# Patient Record
Sex: FEMALE | Race: WHITE | NOT HISPANIC OR LATINO | Employment: PART TIME | URBAN - METROPOLITAN AREA
[De-identification: names, ages, dates, MRNs, and addresses within clinical notes are randomized per-mention and may not be internally consistent; named-entity substitution may affect disease eponyms.]

---

## 2017-06-20 ENCOUNTER — TRANSCRIBE ORDERS (OUTPATIENT)
Dept: ADMINISTRATIVE | Facility: HOSPITAL | Age: 22
End: 2017-06-20

## 2017-06-20 DIAGNOSIS — S80.12XA CONTUSION OF LEFT LOWER LEG, INITIAL ENCOUNTER: Primary | ICD-10-CM

## 2017-06-22 ENCOUNTER — HOSPITAL ENCOUNTER (OUTPATIENT)
Dept: NON INVASIVE DIAGNOSTICS | Facility: HOSPITAL | Age: 22
Discharge: HOME | End: 2017-06-22
Attending: FAMILY MEDICINE
Payer: COMMERCIAL

## 2017-06-22 DIAGNOSIS — S80.12XA CONTUSION OF LEFT LOWER LEG, INITIAL ENCOUNTER: ICD-10-CM

## 2017-06-22 PROCEDURE — 93971 EXTREMITY STUDY: CPT

## 2018-01-16 NOTE — RESULT NOTES
Verified Results  (1) TISSUE EXAM [id number] 03:10PM Montserrat Patel     Test Name Result Flag Reference   LAB AP CASE REPORT (Report)     Surgical Pathology Report             Case: [id number]                   Authorizing Provider: Lelia Shore MD    Collected:      07/21/2016 1510        Ordering Location:   [address] [place] [address]   Received:      07/22/2016 [address] Operating Room                            Pathologist:      Prem Del Castillo MD                              Specimen:  Adrenal, Right   LAB AP FINAL DIAGNOSIS (Report)     A  Right adrenalectomy (15 grams):  - Large hematoma, focally resorbed, replacing adrenal medulla and   associated with focal hemorrhagic necrosis of adrenal cortex  - No evidence of neoplasia or malignancy  Interpretation performed at [facility]  Electronically signed by Prem Del Castillo MD on 7/26/2016 at 1:00 PM   LAB AP SURGICAL ADDITIONAL INFORMATION (Report)     These tests were developed and their performance characteristics   determined by [facility] or [employer]  They may not be cleared or approved by the [place]  Food and   Drug Administration  The FDA has determined that such clearance or   approval is not necessary  These tests are used for clinical purposes  They should not be regarded as investigational or for research  This   laboratory has been approved by [place] 88, designated as a high-complexity   laboratory and is qualified to perform these tests  LAB AP GROSS DESCRIPTION (Report)     A  The specimen is received in formalin, labeled with the patient's name   and hospital number, and is designated right adrenal  The specimen   consists of a tan yellow purple soft tissue fragment measuring 4 6 by 2 6   x 2 1 cm which weighs 15 g  The surface/margin of resection is inked blue     The specimen is sectioned revealing a well-circumscribed red-brown nodule within yellow gold adrenal parenchyma  The nodule measures approximately   3 6 x 2 1 x 1 6 cm  No other gross abnormalities are noted  No lymph nodes   are identified  Representative sections  Four cassettes  Note: The estimated total formalin fixation time based upon information   provided by the submitting clinician and the standard processing schedule   is 29 25 hours  MAC

## 2018-01-17 NOTE — MISCELLANEOUS
Message  Mom notified that mass that was excised was benign  Seeing Dr Rosibel Hodge tomorrow for f/u  Active Problems   Right adrenal mass (255 9) (E27 9)          Current Meds   1  No Reported Medications Recorded    Allergies    1   No Known Drug Allergies    Signatures   Electronically signed by : Marvin Stone MD; Aug 21 2016  9:51AM EST                       (Author)

## 2019-04-24 ENCOUNTER — OFFICE VISIT (OUTPATIENT)
Dept: OBGYN CLINIC | Facility: CLINIC | Age: 24
End: 2019-04-24
Payer: COMMERCIAL

## 2019-04-24 VITALS
HEIGHT: 64 IN | WEIGHT: 228 LBS | SYSTOLIC BLOOD PRESSURE: 108 MMHG | BODY MASS INDEX: 38.93 KG/M2 | DIASTOLIC BLOOD PRESSURE: 62 MMHG

## 2019-04-24 DIAGNOSIS — Z12.4 SCREENING FOR CERVICAL CANCER: Primary | ICD-10-CM

## 2019-04-24 DIAGNOSIS — Z11.51 SCREENING FOR HPV (HUMAN PAPILLOMAVIRUS): ICD-10-CM

## 2019-04-24 DIAGNOSIS — Z11.3 SCREENING FOR STD (SEXUALLY TRANSMITTED DISEASE): ICD-10-CM

## 2019-04-24 PROCEDURE — 99385 PREV VISIT NEW AGE 18-39: CPT | Performed by: OBSTETRICS & GYNECOLOGY

## 2019-04-28 LAB
C TRACH RRNA CVX QL NAA+PROBE: NEGATIVE
CYTOLOGIST CVX/VAG CYTO: NORMAL
DX ICD CODE: NORMAL
Lab: NORMAL
N GONORRHOEA RRNA CVX QL NAA+PROBE: NEGATIVE
OTHER STN SPEC: NORMAL
OTHER STN SPEC: NORMAL
PATH REPORT.FINAL DX SPEC: NORMAL
SL AMB NOTE:: NORMAL
SL AMB SPECIMEN ADEQUACY: NORMAL
SL AMB TEST METHODOLOGY: NORMAL

## 2019-04-30 ENCOUNTER — TELEPHONE (OUTPATIENT)
Dept: OBGYN CLINIC | Facility: CLINIC | Age: 24
End: 2019-04-30

## 2020-10-08 ENCOUNTER — HOSPITAL ENCOUNTER (EMERGENCY)
Facility: HOSPITAL | Age: 25
Discharge: HOME | End: 2020-10-08
Attending: EMERGENCY MEDICINE
Payer: COMMERCIAL

## 2020-10-08 ENCOUNTER — TELEPHONE (OUTPATIENT)
Dept: OBGYN CLINIC | Facility: CLINIC | Age: 25
End: 2020-10-08

## 2020-10-08 VITALS
HEART RATE: 75 BPM | DIASTOLIC BLOOD PRESSURE: 70 MMHG | BODY MASS INDEX: 37.76 KG/M2 | OXYGEN SATURATION: 98 % | RESPIRATION RATE: 20 BRPM | TEMPERATURE: 98.3 F | WEIGHT: 220 LBS | SYSTOLIC BLOOD PRESSURE: 132 MMHG

## 2020-10-08 DIAGNOSIS — A60.00 HERPES GENITALIA: Primary | ICD-10-CM

## 2020-10-08 LAB
BACTERIA UR QL AUTO: ABNORMAL /HPF
BILIRUB UR QL STRIP: NEGATIVE
CLARITY UR: CLEAR
COLOR UR: YELLOW
EXT PREG TEST URINE: NEGATIVE
EXT. CONTROL ED NAV: NORMAL
GLUCOSE UR STRIP-MCNC: NEGATIVE MG/DL
HGB UR QL STRIP.AUTO: NEGATIVE
KETONES UR STRIP-MCNC: NEGATIVE MG/DL
LEUKOCYTE ESTERASE UR QL STRIP: ABNORMAL
MUCOUS THREADS UR QL AUTO: ABNORMAL
NITRITE UR QL STRIP: NEGATIVE
NON-SQ EPI CELLS URNS QL MICRO: ABNORMAL /HPF
PH UR STRIP.AUTO: 6.5 [PH]
PROT UR STRIP-MCNC: NEGATIVE MG/DL
RBC #/AREA URNS AUTO: ABNORMAL /HPF
SP GR UR STRIP.AUTO: <=1.005 (ref 1–1.03)
UROBILINOGEN UR QL STRIP.AUTO: 0.2 E.U./DL
WBC #/AREA URNS AUTO: ABNORMAL /HPF

## 2020-10-08 PROCEDURE — 96372 THER/PROPH/DIAG INJ SC/IM: CPT

## 2020-10-08 PROCEDURE — 87591 N.GONORRHOEAE DNA AMP PROB: CPT | Performed by: EMERGENCY MEDICINE

## 2020-10-08 PROCEDURE — 87491 CHLMYD TRACH DNA AMP PROBE: CPT | Performed by: EMERGENCY MEDICINE

## 2020-10-08 PROCEDURE — 81025 URINE PREGNANCY TEST: CPT | Performed by: EMERGENCY MEDICINE

## 2020-10-08 PROCEDURE — 81001 URINALYSIS AUTO W/SCOPE: CPT | Performed by: EMERGENCY MEDICINE

## 2020-10-08 PROCEDURE — 99284 EMERGENCY DEPT VISIT MOD MDM: CPT | Performed by: EMERGENCY MEDICINE

## 2020-10-08 PROCEDURE — 99283 EMERGENCY DEPT VISIT LOW MDM: CPT

## 2020-10-08 RX ORDER — AZITHROMYCIN 500 MG/1
1000 TABLET, FILM COATED ORAL ONCE
Status: COMPLETED | OUTPATIENT
Start: 2020-10-08 | End: 2020-10-08

## 2020-10-08 RX ORDER — VALACYCLOVIR HYDROCHLORIDE 1 G/1
1000 TABLET, FILM COATED ORAL 2 TIMES DAILY
Qty: 14 TABLET | Refills: 0 | Status: SHIPPED | OUTPATIENT
Start: 2020-10-08 | End: 2020-10-15

## 2020-10-08 RX ORDER — VALACYCLOVIR HYDROCHLORIDE 500 MG/1
1000 TABLET, FILM COATED ORAL ONCE
Status: COMPLETED | OUTPATIENT
Start: 2020-10-08 | End: 2020-10-08

## 2020-10-08 RX ADMIN — VALACYCLOVIR HYDROCHLORIDE 1000 MG: 500 TABLET, FILM COATED ORAL at 13:44

## 2020-10-08 RX ADMIN — AZITHROMYCIN 1000 MG: 500 TABLET, FILM COATED ORAL at 13:44

## 2020-10-08 RX ADMIN — CEFTRIAXONE SODIUM 250 MG: 250 INJECTION, POWDER, FOR SOLUTION INTRAMUSCULAR; INTRAVENOUS at 13:43

## 2020-10-10 LAB
C TRACH DNA SPEC QL NAA+PROBE: NEGATIVE
N GONORRHOEA DNA SPEC QL NAA+PROBE: NEGATIVE

## 2021-02-10 ENCOUNTER — ANNUAL EXAM (OUTPATIENT)
Dept: OBGYN CLINIC | Facility: MEDICAL CENTER | Age: 26
End: 2021-02-10
Payer: COMMERCIAL

## 2021-02-10 VITALS
DIASTOLIC BLOOD PRESSURE: 78 MMHG | SYSTOLIC BLOOD PRESSURE: 122 MMHG | BODY MASS INDEX: 38.93 KG/M2 | WEIGHT: 228 LBS | HEIGHT: 64 IN

## 2021-02-10 DIAGNOSIS — B97.7 LOW RISK HPV INFECTION: Primary | ICD-10-CM

## 2021-02-10 DIAGNOSIS — K64.8 OTHER HEMORRHOIDS: ICD-10-CM

## 2021-02-10 PROCEDURE — 99395 PREV VISIT EST AGE 18-39: CPT | Performed by: OBSTETRICS & GYNECOLOGY

## 2021-02-10 RX ORDER — IMIQUIMOD 12.5 MG/.25G
1 CREAM TOPICAL 3 TIMES WEEKLY
Qty: 24 PACKET | Refills: 3 | Status: SHIPPED | OUTPATIENT
Start: 2021-02-10 | End: 2021-05-11

## 2021-02-10 RX ORDER — HYDROCORTISONE ACETATE PRAMOXINE HCL 2.5; 1 G/100G; G/100G
CREAM TOPICAL 3 TIMES DAILY
Qty: 30 G | Refills: 3 | Status: SHIPPED | OUTPATIENT
Start: 2021-02-10

## 2021-02-10 RX ORDER — OMEPRAZOLE 40 MG/1
CAPSULE, DELAYED RELEASE ORAL
COMMUNITY

## 2021-02-10 NOTE — PROGRESS NOTES
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    Aldara cream used     3  Contraception      Options for birth control with the risk and benefits discussed in detail     1  Combination medications    Pill / patch / ring     Regular they cycle, stop pregnancy but not protect against std    The risk of nausea and vomiting     Risk of blood clot discussed     2  Depo-provera   Good compliance since q 12 weeks   But could cause irregular bleeding weight gain and hair loss   Irreversible bone loss and cant use greater then 3 years  3  IUD    discussed hormonal and non hormonal    Risk of irregular bleeding    Infection increase if not in a monogamous relationship and painful insertion     4  Nexplanon   Discussed that it is progesterone    Advised that the cycles will be irregular for a few months   Advised that it is for 3 years   Does not cause bone loss like the depo provera or decline in estrogen    No protection for STD     At the beginning she was thinking about using depo   We discussed the weight gain   She will think about other LARCS   She will consider Nexplanon and IUD           25 y o ,Patient's last menstrual period was 01/18/2021 (approximate)    C/O hemorrhoids and uncomfortable feeling in the rectum area           Past medical / social / surgical / family history reviewed and updated   Medication and allergies discussed in detail and updated     Review of Systems - History obtained from chart review and the patient  General ROS: negative  Psychological ROS: negative  Ophthalmic ROS: negative  ENT ROS: negative  Allergy and Immunology ROS: negative  Hematological and Lymphatic ROS: negative  Endocrine ROS: negative  Breast ROS: negative for breast lumps  Respiratory ROS: no cough, shortness of breath, or wheezing  Cardiovascular ROS: no chest pain or dyspnea on exertion  Gastrointestinal ROS: no abdominal pain, change in bowel habits, or black or bloody stools itching and fear for hemorrhoids   Genito-Urinary ROS: no dysuria, trouble voiding, or hematuria  Musculoskeletal ROS: negative  Neurological ROS: no TIA or stroke symptoms  Dermatological ROS: negative        Physical Exam  Vitals signs reviewed  Constitutional:       Appearance: She is well-developed  Neck:      Musculoskeletal: Normal range of motion  Thyroid: No thyromegaly  Cardiovascular:      Rate and Rhythm: Normal rate  Heart sounds: Normal heart sounds  Pulmonary:      Effort: Pulmonary effort is normal  No accessory muscle usage or respiratory distress  Chest:      Chest wall: No tenderness  Breasts: Breasts are symmetrical          Right: No inverted nipple, mass or tenderness  Left: No inverted nipple, mass or tenderness  Abdominal:      General: There is no distension  Palpations: Abdomen is soft  Tenderness: There is no abdominal tenderness  There is no guarding or rebound  Genitourinary:     Exam position: Supine  Labia:         Right: No rash, tenderness, lesion or injury  Left: No rash, tenderness, lesion or injury  Vagina: Normal  No foreign body  No vaginal discharge or bleeding  Cervix: No cervical motion tenderness or discharge  Uterus: Not enlarged and not fixed  Adnexa:         Right: No mass, tenderness or fullness  Left: No mass, tenderness or fullness  Rectum: No external hemorrhoid  Lymphadenopathy:      Cervical: No cervical adenopathy  Upper Body:      Right upper body: No supraclavicular adenopathy  Left upper body: No supraclavicular adenopathy  Neurological:      Mental Status: She is alert and oriented to person, place, and time     Psychiatric:         Speech: Speech normal          Behavior: Behavior normal          Thought Content:  Thought content normal          Judgment: Judgment normal

## 2022-05-23 ENCOUNTER — HOSPITAL ENCOUNTER (OUTPATIENT)
Dept: RADIOLOGY | Facility: HOSPITAL | Age: 27
Discharge: HOME | End: 2022-05-23
Attending: FAMILY MEDICINE
Payer: COMMERCIAL

## 2022-05-23 DIAGNOSIS — M79.641 RIGHT HAND PAIN: ICD-10-CM

## 2022-05-23 PROCEDURE — 73090 X-RAY EXAM OF FOREARM: CPT

## 2022-05-23 PROCEDURE — 73130 X-RAY EXAM OF HAND: CPT

## 2023-05-10 ENCOUNTER — TELEPHONE (OUTPATIENT)
Dept: OBGYN CLINIC | Facility: HOSPITAL | Age: 28
End: 2023-05-10

## 2023-05-10 NOTE — TELEPHONE ENCOUNTER
Will you be willing to see this patient? Patient recently had surgery with another hospital, the father would like to transfer her care due to distance  She recently had hardware placed in humerus on 5/8/23 Patient is scheduled with you on 5/16 and is having the records sent over ?

## 2023-05-10 NOTE — TELEPHONE ENCOUNTER
Hello,  Please advise if the following patient can be forced onto the schedule:    Patient: Cori Carvajal Primer     : [birth year]    MRN: [id number]    Call back #: Antonia Lambert [phone]    Insurance: Mahesh Murphy    Reason for appointment: Daughter recently had surgery with another hospital, they would like to transfer her care due to distance  She recently had hardware placed in humerus on 23 father is able to obtain records if requested    Requested doctor/location: [place]       Thank you

## 2023-05-16 ENCOUNTER — HOSPITAL ENCOUNTER (OUTPATIENT)
Dept: RADIOLOGY | Facility: HOSPITAL | Age: 28
Discharge: HOME | End: 2023-05-16
Attending: ORTHOPAEDIC SURGERY

## 2023-05-16 ENCOUNTER — OFFICE VISIT (OUTPATIENT)
Dept: OBGYN CLINIC | Facility: HOSPITAL | Age: 28
End: 2023-05-16

## 2023-05-16 VITALS
SYSTOLIC BLOOD PRESSURE: 105 MMHG | DIASTOLIC BLOOD PRESSURE: 72 MMHG | HEIGHT: 64 IN | BODY MASS INDEX: 37.9 KG/M2 | HEART RATE: 81 BPM | WEIGHT: 222 LBS

## 2023-05-16 DIAGNOSIS — Z98.890 S/P ORIF (OPEN REDUCTION INTERNAL FIXATION) FRACTURE: ICD-10-CM

## 2023-05-16 DIAGNOSIS — S42.301A CLOSED FRACTURE OF SHAFT OF RIGHT HUMERUS, UNSPECIFIED FRACTURE MORPHOLOGY, INITIAL ENCOUNTER: ICD-10-CM

## 2023-05-16 DIAGNOSIS — Z87.81 S/P ORIF (OPEN REDUCTION INTERNAL FIXATION) FRACTURE: ICD-10-CM

## 2023-05-16 DIAGNOSIS — M89.8X2 PAIN OF RIGHT HUMERUS: Primary | ICD-10-CM

## 2023-05-16 DIAGNOSIS — M89.8X2 PAIN OF RIGHT HUMERUS: ICD-10-CM

## 2023-05-16 RX ORDER — ONDANSETRON 4 MG/1
TABLET, FILM COATED ORAL
COMMUNITY
Start: 2023-05-09

## 2023-05-16 RX ORDER — OXYCODONE HYDROCHLORIDE 5 MG/1
TABLET ORAL
COMMUNITY
Start: 2023-05-09

## 2023-05-16 RX ORDER — SULFAMETHOXAZOLE AND TRIMETHOPRIM 800; 160 MG/1; MG/1
1 TABLET ORAL 2 TIMES DAILY
COMMUNITY
Start: 2023-05-01

## 2023-05-16 RX ORDER — LORAZEPAM 0.5 MG/1
TABLET ORAL
COMMUNITY
Start: 2023-05-15
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
## 2023-05-18 ENCOUNTER — APPOINTMENT (OUTPATIENT)
Dept: RADIOLOGY | Facility: CLINIC | Age: 28
End: 2023-05-18

## 2023-05-18 DIAGNOSIS — S21.90XD: ICD-10-CM

## 2023-05-18 DIAGNOSIS — S27.2XXD: ICD-10-CM

## 2023-05-23 ENCOUNTER — OFFICE VISIT (OUTPATIENT)
Dept: OBGYN CLINIC | Facility: HOSPITAL | Age: 28
End: 2023-05-23

## 2023-05-23 ENCOUNTER — HOSPITAL ENCOUNTER (OUTPATIENT)
Dept: RADIOLOGY | Facility: HOSPITAL | Age: 28
Discharge: HOME | End: 2023-05-23
Attending: ORTHOPAEDIC SURGERY

## 2023-05-23 VITALS
WEIGHT: 225 LBS | SYSTOLIC BLOOD PRESSURE: 114 MMHG | DIASTOLIC BLOOD PRESSURE: 71 MMHG | BODY MASS INDEX: 38.41 KG/M2 | HEART RATE: 69 BPM | HEIGHT: 64 IN

## 2023-05-23 DIAGNOSIS — S12.001D CLOSED NONDISPLACED FRACTURE OF FIRST CERVICAL VERTEBRA WITH ROUTINE HEALING, UNSPECIFIED FRACTURE MORPHOLOGY, SUBSEQUENT ENCOUNTER: ICD-10-CM

## 2023-05-23 DIAGNOSIS — M89.8X2 PAIN OF RIGHT HUMERUS: ICD-10-CM

## 2023-05-23 DIAGNOSIS — Z87.81 S/P ORIF (OPEN REDUCTION INTERNAL FIXATION) FRACTURE: ICD-10-CM

## 2023-05-23 DIAGNOSIS — S42.301A CLOSED FRACTURE OF SHAFT OF RIGHT HUMERUS, UNSPECIFIED FRACTURE MORPHOLOGY, INITIAL ENCOUNTER: ICD-10-CM

## 2023-05-23 DIAGNOSIS — R52 PAIN: Primary | ICD-10-CM

## 2023-05-23 DIAGNOSIS — Z98.890 S/P ORIF (OPEN REDUCTION INTERNAL FIXATION) FRACTURE: ICD-10-CM
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
## 2023-05-24 ENCOUNTER — EVALUATION (OUTPATIENT)
Dept: PHYSICAL THERAPY | Facility: CLINIC | Age: 28
End: 2023-05-24

## 2023-05-24 DIAGNOSIS — Z98.890 S/P ORIF (OPEN REDUCTION INTERNAL FIXATION) FRACTURE: ICD-10-CM

## 2023-05-24 DIAGNOSIS — S42.301A CLOSED FRACTURE OF SHAFT OF RIGHT HUMERUS, UNSPECIFIED FRACTURE MORPHOLOGY, INITIAL ENCOUNTER: ICD-10-CM

## 2023-05-24 DIAGNOSIS — M89.8X2 PAIN OF RIGHT HUMERUS: Primary | ICD-10-CM

## 2023-05-24 DIAGNOSIS — Z87.81 S/P ORIF (OPEN REDUCTION INTERNAL FIXATION) FRACTURE: ICD-10-CM
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
## 2023-05-31 ENCOUNTER — OFFICE VISIT (OUTPATIENT)
Dept: PHYSICAL THERAPY | Facility: CLINIC | Age: 28
End: 2023-05-31

## 2023-05-31 DIAGNOSIS — M89.8X2 PAIN OF RIGHT HUMERUS: Primary | ICD-10-CM

## 2023-05-31 NOTE — PROGRESS NOTES
"Daily Note     Today's date: 2023  Patient name: Chikis Stone  : 1995  MRN: [id number]  Referring provider: Scott Delacruz PA-C  Dx:   Encounter Diagnosis     ICD-10-CM    1  Pain of right humerus  M89 8X2                      Subjective: pt reports that she is has min soreness after her LV and is having no difficulty with her hep  Objective: See treatment diary below      Assessment:  Initiated tx as listed  Pt has made sig improvements in elbow flexion and extension but continues to be most limited in extension  No sig pain reprots t/o tx  Monitor response and progress as muna NV     Plan: Continue per plan of care        Precautions: C1 fracture, hard brace, lifting restriction of 5#      Manuals            Elbow/shoulder rom kl kl                                                  Neuro Re-Ed                                                                                                        Ther Ex             Extension hang 1# x3' 1# 3'           Pulley flexion/scaption  flexion 5\"x20           rows  nv           Wrist curls  3#x20           Elbow self-flexion stretch  hep           Wrist flex/ext stretch  30\"x3ea           Triceps press  nv           scap retraction with cane ext  10\"x10           Ther Activity             ube  4'/4'                        Gait Training                                       Modalities                                            "

## 2023-06-07 ENCOUNTER — OFFICE VISIT (OUTPATIENT)
Dept: PHYSICAL THERAPY | Facility: CLINIC | Age: 28
End: 2023-06-07
Payer: COMMERCIAL

## 2023-06-07 DIAGNOSIS — S42.301A CLOSED FRACTURE OF SHAFT OF RIGHT HUMERUS, UNSPECIFIED FRACTURE MORPHOLOGY, INITIAL ENCOUNTER: ICD-10-CM

## 2023-06-07 DIAGNOSIS — M89.8X2 PAIN OF RIGHT HUMERUS: Primary | ICD-10-CM

## 2023-06-07 DIAGNOSIS — Z87.81 S/P ORIF (OPEN REDUCTION INTERNAL FIXATION) FRACTURE: ICD-10-CM

## 2023-06-07 DIAGNOSIS — Z98.890 S/P ORIF (OPEN REDUCTION INTERNAL FIXATION) FRACTURE: ICD-10-CM

## 2023-06-07 PROCEDURE — 97140 MANUAL THERAPY 1/> REGIONS: CPT

## 2023-06-07 PROCEDURE — 97110 THERAPEUTIC EXERCISES: CPT

## 2023-06-07 PROCEDURE — 97530 THERAPEUTIC ACTIVITIES: CPT

## 2023-06-07 NOTE — PROGRESS NOTES
"Daily Note     Today's date: 2023  Patient name: Madisyn Manzo  : 1995  MRN: [id number]  Referring provider: Marylen Skillern, PA-C  Dx:   Encounter Diagnosis     ICD-10-CM    1  Pain of right humerus  M89 8X2       2  Closed fracture of shaft of right humerus, unspecified fracture morphology, initial encounter  S42 301A       3  S/P ORIF (open reduction internal fixation) fracture  Z98 890     Z87 81           Start Time: 848  Stop Time: 928  Total time in clinic (min): 40 minutes       Subjective: Patient states, \"It's a little stiff  \"      Objective: See treatment diary below  Assessment: Progression of therapeutic exercise program is tolerated well  Right shoulder ROM is WNLs  Right elbow ROM is approaching normal       Plan: Continue treatment as per PT plan of care         Precautions: C1 fracture, hard brace, lifting restriction of 5#      Manuals  67          Elbow/shoulder rom kl kl JLW                                                 Neuro Re-Ed                                                                                                        Ther Ex             Extension hang 1# x3' 1# 3'           Pulley flexion/scaption  flexion 5\"x20 flex, scap  5\"x20 ea          rows  nv green  20          Wrist curls  3#x20 flex, ext  3#  20 ea          Elbow self-flexion stretch  hep           Wrist flex/ext stretch  30\"x3ea 30\"x3 ea          Triceps press  nv green  20          scap retraction with cane ext  10\"x10 10\"x10                       Ther Activity             ube  4'/4' 4'/4'                       Gait Training                                       Modalities                                            "

## 2023-06-13 ENCOUNTER — HOSPITAL ENCOUNTER (OUTPATIENT)
Dept: RADIOLOGY | Facility: HOSPITAL | Age: 28
Discharge: HOME | End: 2023-06-13
Attending: ORTHOPAEDIC SURGERY
Payer: COMMERCIAL

## 2023-06-13 ENCOUNTER — OFFICE VISIT (OUTPATIENT)
Dept: OBGYN CLINIC | Facility: HOSPITAL | Age: 28
End: 2023-06-13
Payer: COMMERCIAL

## 2023-06-13 VITALS
BODY MASS INDEX: 38.41 KG/M2 | HEIGHT: 64 IN | SYSTOLIC BLOOD PRESSURE: 104 MMHG | WEIGHT: 225 LBS | HEART RATE: 69 BPM | DIASTOLIC BLOOD PRESSURE: 71 MMHG

## 2023-06-13 DIAGNOSIS — Z98.890 S/P ORIF (OPEN REDUCTION INTERNAL FIXATION) FRACTURE: Primary | ICD-10-CM

## 2023-06-13 DIAGNOSIS — Z87.81 S/P ORIF (OPEN REDUCTION INTERNAL FIXATION) FRACTURE: ICD-10-CM

## 2023-06-13 DIAGNOSIS — Z87.81 S/P ORIF (OPEN REDUCTION INTERNAL FIXATION) FRACTURE: Primary | ICD-10-CM

## 2023-06-13 DIAGNOSIS — Z98.890 S/P ORIF (OPEN REDUCTION INTERNAL FIXATION) FRACTURE: ICD-10-CM

## 2023-06-13 PROCEDURE — 99213 OFFICE O/P EST LOW 20 MIN: CPT | Performed by: ORTHOPAEDIC SURGERY

## 2023-06-13 PROCEDURE — 73060 X-RAY EXAM OF HUMERUS: CPT

## 2023-06-13 RX ORDER — SENNA PLUS 8.6 MG/1
8.6 TABLET ORAL
COMMUNITY
Start: 2023-05-09

## 2023-06-13 RX ORDER — ACETAMINOPHEN 325 MG/1
650 TABLET ORAL
COMMUNITY
Start: 2023-05-09

## 2023-06-13 RX ORDER — DOCUSATE SODIUM 100 MG/1
100 CAPSULE, LIQUID FILLED ORAL
COMMUNITY
Start: 2023-05-09

## 2023-06-13 RX ORDER — OMEPRAZOLE 10 MG/1
10 CAPSULE, DELAYED RELEASE ORAL
COMMUNITY
Start: 2023-05-06
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
## 2023-06-13 NOTE — ASSESSMENT & PLAN NOTE
We are going to increase her weight lifting to 30 pounds  We will see her back in 6 weeks with repeat films  We will leave her overall weight restrictions per Dr Emily Wang

## 2023-06-14 ENCOUNTER — OFFICE VISIT (OUTPATIENT)
Dept: PHYSICAL THERAPY | Facility: CLINIC | Age: 28
End: 2023-06-14
Payer: COMMERCIAL

## 2023-06-14 DIAGNOSIS — Z98.890 S/P ORIF (OPEN REDUCTION INTERNAL FIXATION) FRACTURE: ICD-10-CM

## 2023-06-14 DIAGNOSIS — Z87.81 S/P ORIF (OPEN REDUCTION INTERNAL FIXATION) FRACTURE: ICD-10-CM

## 2023-06-14 DIAGNOSIS — S42.301A CLOSED FRACTURE OF SHAFT OF RIGHT HUMERUS, UNSPECIFIED FRACTURE MORPHOLOGY, INITIAL ENCOUNTER: ICD-10-CM

## 2023-06-14 DIAGNOSIS — M89.8X2 PAIN OF RIGHT HUMERUS: Primary | ICD-10-CM

## 2023-06-14 PROCEDURE — 97530 THERAPEUTIC ACTIVITIES: CPT

## 2023-06-14 PROCEDURE — 97110 THERAPEUTIC EXERCISES: CPT

## 2023-06-14 NOTE — PROGRESS NOTES
"Daily Note     Today's date: 2023  Patient name: Monisha Dick  : 1995  MRN: [id number]  Referring provider: Joey Ortega PA-C  Dx:   Encounter Diagnosis     ICD-10-CM    1  Pain of right humerus  M89 8X2       2  Closed fracture of shaft of right humerus, unspecified fracture morphology, initial encounter  S42 301A       3  S/P ORIF (open reduction internal fixation) fracture  Z98 890     Z87 81           Start Time: 835  Stop Time: 921  Total time in clinic (min): 46 minutes       Subjective: Patient reports she was given a 30 pound lifting restriction in regards to her right elbow  Patient reports her left knee has been bothering her  Objective: See treatment diary below  Assessment: Progression of therapeutic exercise program is tolerated well without complaints  Right elbow and shoulder ROM is WNLs  Plan: Continue treatment as per PT plan of care         Precautions: C1 fracture, hard brace, lifting restriction of 5#      Manuals  6 6         Elbow/shoulder rom kl kl JLW                                                 Neuro Re-Ed                                                                                                        Ther Ex             Extension hang 1# x3' 1# 3'           Pulleys flexion/scaption  flexion 5\"x20 flex, scap  5\"x20 ea          rows  nv green  20 javier  18 2  3x10         lpd    javier  14 8  2x10         Wrist curls  3#x20 flex, ext  3#  20 ea flex, ext  4#  30 ea         Pronation/  supination    5#  20 ea         Elbow self-flexion stretch  hep           Wrist flex/ext stretch  30\"x3ea 30\"x3 ea          Triceps press  nv green  20 javier  20 4  3x10         scap retraction with cane ext  10\"x10 10\"x10          push ups     counter   2x10         Bicep curl     5#   3x10         shoulder flex, scap     3#   20 ea                      Ther Activity             ube  4'/4' 4'/4' 4'/4'                      Gait Training      " Modalities

## 2023-06-20 ENCOUNTER — HOSPITAL ENCOUNTER (OUTPATIENT)
Dept: RADIOLOGY | Facility: HOSPITAL | Age: 28
Discharge: HOME | End: 2023-06-20
Attending: ORTHOPAEDIC SURGERY
Payer: COMMERCIAL

## 2023-06-20 ENCOUNTER — OFFICE VISIT (OUTPATIENT)
Dept: OBGYN CLINIC | Facility: HOSPITAL | Age: 28
End: 2023-06-20
Payer: COMMERCIAL

## 2023-06-20 ENCOUNTER — TELEPHONE (OUTPATIENT)
Dept: OBGYN CLINIC | Facility: HOSPITAL | Age: 28
End: 2023-06-20

## 2023-06-20 VITALS
SYSTOLIC BLOOD PRESSURE: 110 MMHG | BODY MASS INDEX: 38.39 KG/M2 | HEIGHT: 64 IN | HEART RATE: 79 BPM | DIASTOLIC BLOOD PRESSURE: 70 MMHG | WEIGHT: 224.87 LBS

## 2023-06-20 DIAGNOSIS — S12.001D CLOSED NONDISPLACED FRACTURE OF FIRST CERVICAL VERTEBRA WITH ROUTINE HEALING, UNSPECIFIED FRACTURE MORPHOLOGY, SUBSEQUENT ENCOUNTER: Primary | ICD-10-CM

## 2023-06-20 DIAGNOSIS — S12.001D CLOSED NONDISPLACED FRACTURE OF FIRST CERVICAL VERTEBRA WITH ROUTINE HEALING, UNSPECIFIED FRACTURE MORPHOLOGY, SUBSEQUENT ENCOUNTER: ICD-10-CM

## 2023-06-20 PROCEDURE — 72040 X-RAY EXAM NECK SPINE 2-3 VW: CPT

## 2023-06-20 PROCEDURE — 99213 OFFICE O/P EST LOW 20 MIN: CPT | Performed by: ORTHOPAEDIC SURGERY

## 2023-06-20 NOTE — TELEPHONE ENCOUNTER
Caller: Patient     Doctor: [address] [facility]    Reason for call: Patient is calling stating she called 3 places and [facility] stated they do not have the cervical soft collar  She stated we did not have any in the office today   She would like advice on what to do    Call back#: [phone]

## 2023-06-20 NOTE — TELEPHONE ENCOUNTER
LM letting pt know we got some collars in this afternoon and she can stop back in and get a collar  Let her know to call back and let us know what time
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
## 2023-06-20 NOTE — TELEPHONE ENCOUNTER
Caller: Dottie Marquez    Doctor: Charo Torres    Reason for call: Patient wants to know if some can  the collar or if she needs to be fitted for it  If she has to pick it up can it be done tomorrow?     Call back#: [phone]

## 2023-06-21 ENCOUNTER — OFFICE VISIT (OUTPATIENT)
Dept: PHYSICAL THERAPY | Facility: CLINIC | Age: 28
End: 2023-06-21
Payer: COMMERCIAL

## 2023-06-21 DIAGNOSIS — Z98.890 S/P ORIF (OPEN REDUCTION INTERNAL FIXATION) FRACTURE: ICD-10-CM

## 2023-06-21 DIAGNOSIS — Z87.81 S/P ORIF (OPEN REDUCTION INTERNAL FIXATION) FRACTURE: ICD-10-CM

## 2023-06-21 DIAGNOSIS — M89.8X2 PAIN OF RIGHT HUMERUS: Primary | ICD-10-CM

## 2023-06-21 DIAGNOSIS — S42.301A CLOSED FRACTURE OF SHAFT OF RIGHT HUMERUS, UNSPECIFIED FRACTURE MORPHOLOGY, INITIAL ENCOUNTER: ICD-10-CM

## 2023-06-21 PROCEDURE — 97110 THERAPEUTIC EXERCISES: CPT

## 2023-06-21 PROCEDURE — 97530 THERAPEUTIC ACTIVITIES: CPT

## 2023-06-21 NOTE — PROGRESS NOTES
"Daily Note     Today's date: 2023  Patient name: Jhoana Hughes  : 1995  MRN: [id number]  Referring provider: Remberto Diaz PA-C  Dx:   Encounter Diagnosis     ICD-10-CM    1  Pain of right humerus  M89 8X2       2  Closed fracture of shaft of right humerus, unspecified fracture morphology, initial encounter  S42 301A       3  S/P ORIF (open reduction internal fixation) fracture  Z98 890     Z87 81           Start Time: 848  Stop Time: 928  Total time in clinic (min): 40 minutes       Subjective: Patient reports she experienced significant muscle soreness in her chest following the last PT session  Patient denies any significant stiffness in her right elbow  Objective: See treatment diary below  Assessment: Progression of therapeutic exercise program is tolerated well  Upper extremity strength is improving  Plan: Continue treatment as per PT plan of care         Precautions: C1 fracture, soft collar      Manuals         Elbow/shoulder rom kl kl JLW                                                 Neuro Re-Ed                                                                                                        Ther Ex             Extension hang 1# x3' 1# 3'           Pulleys flexion/scaption  flexion 5\"x20 flex, scap  5\"x20 ea          rows  nv green  20 javier  18 2  3x10 javier  19 5  3x10        lpd    javier  14 8  2x10 javier  15 1  3x10        Wrist curls  3#x20 flex, ext  3#  20 ea flex, ext  4#  30 ea flex, ext  5#  20 ea        Pronation/  supination    5#  20 ea 5#  20 ea        Elbow self-flexion stretch  hep           Wrist flex/ext stretch  30\"x3ea 30\"x3 ea          Triceps press  nv green  20 javier  20 4  3x10 javier  21 8  3x10        scap retraction with cane ext  10\"x10 10\"x10          push ups     counter   2x10  counter   3x10        Bicep curl     5#   3x10  8#   2x10        shoulder flex, scap     3#   20 ea  3#   20 ea        wall " "pball taps      30\"x2                                  Ther Activity             ube  4'/4' 4'/4' 4'/4' 4'/4'                     Gait Training                                       Modalities                                            "

## 2023-06-28 ENCOUNTER — OFFICE VISIT (OUTPATIENT)
Dept: PHYSICAL THERAPY | Facility: CLINIC | Age: 28
End: 2023-06-28
Payer: COMMERCIAL

## 2023-06-28 DIAGNOSIS — Z87.81 S/P ORIF (OPEN REDUCTION INTERNAL FIXATION) FRACTURE: ICD-10-CM

## 2023-06-28 DIAGNOSIS — M89.8X2 PAIN OF RIGHT HUMERUS: Primary | ICD-10-CM

## 2023-06-28 DIAGNOSIS — S42.301A CLOSED FRACTURE OF SHAFT OF RIGHT HUMERUS, UNSPECIFIED FRACTURE MORPHOLOGY, INITIAL ENCOUNTER: ICD-10-CM

## 2023-06-28 DIAGNOSIS — Z98.890 S/P ORIF (OPEN REDUCTION INTERNAL FIXATION) FRACTURE: ICD-10-CM

## 2023-06-28 PROCEDURE — 97530 THERAPEUTIC ACTIVITIES: CPT

## 2023-06-28 PROCEDURE — 97110 THERAPEUTIC EXERCISES: CPT

## 2023-06-28 NOTE — PROGRESS NOTES
"Daily Note     Today's date: 2023  Patient name: Meryl Nicole  : 1995  MRN: [id number]  Referring provider: Mariia Mann PA-C  Dx:   Encounter Diagnosis     ICD-10-CM    1  Pain of right humerus  M89 8X2       2  Closed fracture of shaft of right humerus, unspecified fracture morphology, initial encounter  S42 301A       3  S/P ORIF (open reduction internal fixation) fracture  Z98 890     Z87 81           Start Time: 845  Stop Time: 925  Total time in clinic (min): 40 minutes       Subjective: Patient reports her right arm is \"fine  \"  Patient reports soreness in her lower cervical/upper thoracic spine  Objective: See treatment diary below  Assessment: Patient is doing well with therapeutic exercise program   Fatigue noted while performing bicep curls with increased weight  Plan: Continue treatment as per PT plan of care         Precautions: C1 fracture, soft collar      Manuals  6       Elbow/shoulder rom kl kl JLW                                                 Neuro Re-Ed                                                                                                        Ther Ex             Extension hang 1# x3' 1# 3'           Pulleys flexion/scaption  flexion 5\"x20 flex, scap  5\"x20 ea          rows  nv green  20 javier  18 2  3x10 javier  19 5  3x10 javier  20 1  3x10       lpd    javier  14 8  2x10 javier  15 1  3x10 javier  16 2  3x10       Wrist curls  3#x20 flex, ext  3#  20 ea flex, ext  4#  30 ea flex, ext  5#  20 ea flex, ext  5#  20 ea       Pronation/  supination    5#  20 ea 5#  20 ea 5#  20 ea       Elbow self-flexion stretch  hep           Wrist flex/ext stretch  30\"x3ea 30\"x3 ea          Triceps press  nv green  20 javier  20 4  3x10 javier  21 8  3x10 javier  22 7  3x10       scap retraction with cane ext  10\"x10 10\"x10          push ups     counter   2x10  counter   3x10  counter   3x10       Bicep curl     5#   3x10  8#   " "2x10  10#   2x10   8#   1x10        shoulder flex, scap     3#   20 ea  3#   20 ea  3#   20 ea        wall pball taps      30\"x2  30\"x2                                 Ther Activity             ube  4'/4' 4'/4' 4'/4' 4'/4' 4'/4'                    Gait Training                                       Modalities                                            "

## 2023-07-05 ENCOUNTER — OFFICE VISIT (OUTPATIENT)
Dept: PHYSICAL THERAPY | Facility: CLINIC | Age: 28
End: 2023-07-05
Payer: COMMERCIAL

## 2023-07-05 DIAGNOSIS — M89.8X2 PAIN OF RIGHT HUMERUS: Primary | ICD-10-CM

## 2023-07-05 PROCEDURE — 97110 THERAPEUTIC EXERCISES: CPT | Performed by: PHYSICAL THERAPIST

## 2023-07-05 PROCEDURE — 97530 THERAPEUTIC ACTIVITIES: CPT | Performed by: PHYSICAL THERAPIST

## 2023-07-05 NOTE — PROGRESS NOTES
Daily Note     Today's date: 2023  Patient name: Shawna Lagos  : 1995  MRN: [id number]  Referring provider: Jonatan Ortiz PA-C  Dx:   Encounter Diagnosis     ICD-10-CM    1. Pain of right humerus  M89.8X2                         Subjective: pt offers no new complaints in regards to her shoulder. Continued reports of stiffness t/o cervical and upper thoracic spine. Reports compliance with her current hep. Objective: See treatment diary below. R elbow:    Tender to Palpation: none    ROM:   Flexion:120   Extension: 0   Supination: 40   Pronation:30      Strength:   Flexion: 4+/5   Extension:  4+/5   Supination:  5-/5   Pronation:  4+/5    R shoulder:    AROM:   Flexion: 160   Abduction:150   Shoulder shrug noted with active flexion and abduction    PROM:   Flexion: 160   Abduction: 160   ER:  wf   IR: wfl    Strength:    Flexion:  4+/5   Abduction:  4+ /5   ER:    5/5   IR:     5-/5                        Assessment: Pt has achieved a majority of her goals set at the start of therapy, is independent with her hep, and will be Dc'd at this time. She is instructed to call the clinic if she has question with her hep or if symptoms return.        Plan: DC to hep       Precautions: C1 fracture, soft collar      Manuals       Elbow/shoulder rom kl kl JLW                                                 Neuro Re-Ed                                                                                                        Ther Ex             Extension hang 1# x3' 1# 3'           Pulleys flexion/scaption  flexion 5"x20 flex, scap  5"x20 ea          rows  nv green  20 javier  18.2  3x10 javier  19.5  3x10 javier  20.1  3x10 javier 20.5 x20      lpd    javier  14.8  2x10 javier  15.1  3x10 javier  16.2  3x10 18.0 x20      Wrist curls  3#x20 flex, ext  3#  20 ea flex, ext  4#  30 ea flex, ext  5#  20 ea flex, ext  5#  20 ea 5"x20 ea      Pronation/  supination 5#  20 ea 5#  20 ea 5#  20 ea 5#x20ea      Elbow self-flexion stretch  hep           Wrist flex/ext stretch  30"x3ea 30"x3 ea          Triceps press  nv green  20 javier  20.4  3x10 javier  21.8  3x10 javier  22.7  3x10       scap retraction with cane ext  10"x10 10"x10          push ups     counter   2x10  counter   3x10  counter   3x10 Pl[place] x20      Bicep curl     5#   3x10  8#   2x10  10#   2x10   8#   1x10  10#x20      shoulder flex, scap     3#   20 ea  3#   20 ea  3#   20 ea        wall pball taps      30"x2  30"x2                                 Ther Activity             ube  4'/4' 4'/4' 4'/4' 4'/4' 4'/4' 4'/4'                   Gait Training                                       Modalities

## 2023-07-18 ENCOUNTER — HOSPITAL ENCOUNTER (OUTPATIENT)
Dept: RADIOLOGY | Facility: HOSPITAL | Age: 28
Discharge: HOME | End: 2023-07-18
Attending: ORTHOPAEDIC SURGERY
Payer: COMMERCIAL

## 2023-07-18 ENCOUNTER — OFFICE VISIT (OUTPATIENT)
Dept: OBGYN CLINIC | Facility: HOSPITAL | Age: 28
End: 2023-07-18
Payer: COMMERCIAL

## 2023-07-18 VITALS
HEIGHT: 64 IN | WEIGHT: 224.87 LBS | DIASTOLIC BLOOD PRESSURE: 72 MMHG | SYSTOLIC BLOOD PRESSURE: 111 MMHG | HEART RATE: 73 BPM | BODY MASS INDEX: 38.39 KG/M2

## 2023-07-18 DIAGNOSIS — S12.001D CLOSED NONDISPLACED FRACTURE OF FIRST CERVICAL VERTEBRA WITH ROUTINE HEALING, UNSPECIFIED FRACTURE MORPHOLOGY, SUBSEQUENT ENCOUNTER: Primary | ICD-10-CM

## 2023-07-18 DIAGNOSIS — S12.001D CLOSED NONDISPLACED FRACTURE OF FIRST CERVICAL VERTEBRA WITH ROUTINE HEALING, UNSPECIFIED FRACTURE MORPHOLOGY, SUBSEQUENT ENCOUNTER: ICD-10-CM

## 2023-07-18 PROCEDURE — 72040 X-RAY EXAM NECK SPINE 2-3 VW: CPT

## 2023-07-18 PROCEDURE — 99213 OFFICE O/P EST LOW 20 MIN: CPT | Performed by: ORTHOPAEDIC SURGERY
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
## 2023-07-25 ENCOUNTER — OFFICE VISIT (OUTPATIENT)
Dept: OBGYN CLINIC | Facility: HOSPITAL | Age: 28
End: 2023-07-25
Payer: COMMERCIAL

## 2023-07-25 ENCOUNTER — HOSPITAL ENCOUNTER (OUTPATIENT)
Dept: RADIOLOGY | Facility: HOSPITAL | Age: 28
Discharge: HOME | End: 2023-07-25
Attending: ORTHOPAEDIC SURGERY
Payer: COMMERCIAL

## 2023-07-25 VITALS
WEIGHT: 224 LBS | HEIGHT: 64 IN | BODY MASS INDEX: 38.24 KG/M2 | DIASTOLIC BLOOD PRESSURE: 67 MMHG | HEART RATE: 72 BPM | SYSTOLIC BLOOD PRESSURE: 98 MMHG

## 2023-07-25 DIAGNOSIS — Z98.890 S/P ORIF (OPEN REDUCTION INTERNAL FIXATION) FRACTURE: Primary | ICD-10-CM

## 2023-07-25 DIAGNOSIS — Z87.81 S/P ORIF (OPEN REDUCTION INTERNAL FIXATION) FRACTURE: Primary | ICD-10-CM

## 2023-07-25 DIAGNOSIS — S42.324D CLOSED NONDISPLACED TRANSVERSE FRACTURE OF SHAFT OF RIGHT HUMERUS WITH ROUTINE HEALING, SUBSEQUENT ENCOUNTER: ICD-10-CM

## 2023-07-25 DIAGNOSIS — Z87.81 S/P ORIF (OPEN REDUCTION INTERNAL FIXATION) FRACTURE: ICD-10-CM

## 2023-07-25 DIAGNOSIS — Z98.890 S/P ORIF (OPEN REDUCTION INTERNAL FIXATION) FRACTURE: ICD-10-CM

## 2023-07-25 PROCEDURE — 99213 OFFICE O/P EST LOW 20 MIN: CPT | Performed by: ORTHOPAEDIC SURGERY

## 2023-07-25 PROCEDURE — 73060 X-RAY EXAM OF HUMERUS: CPT
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
## 2023-07-28 ENCOUNTER — EVALUATION (OUTPATIENT)
Dept: PHYSICAL THERAPY | Facility: CLINIC | Age: 28
End: 2023-07-28
Payer: COMMERCIAL

## 2023-07-28 DIAGNOSIS — S12.001D CLOSED NONDISPLACED FRACTURE OF FIRST CERVICAL VERTEBRA WITH ROUTINE HEALING, UNSPECIFIED FRACTURE MORPHOLOGY, SUBSEQUENT ENCOUNTER: ICD-10-CM

## 2023-07-28 DIAGNOSIS — M89.8X2 PAIN OF RIGHT HUMERUS: Primary | ICD-10-CM

## 2023-07-28 DIAGNOSIS — M54.2 CERVICAL SPINE PAIN: ICD-10-CM

## 2023-07-28 PROCEDURE — 97140 MANUAL THERAPY 1/> REGIONS: CPT | Performed by: PHYSICAL THERAPIST

## 2023-07-28 PROCEDURE — 97161 PT EVAL LOW COMPLEX 20 MIN: CPT | Performed by: PHYSICAL THERAPIST
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
## 2023-08-02 ENCOUNTER — OFFICE VISIT (OUTPATIENT)
Dept: PHYSICAL THERAPY | Facility: CLINIC | Age: 28
End: 2023-08-02
Payer: COMMERCIAL

## 2023-08-02 DIAGNOSIS — M54.2 CERVICAL SPINE PAIN: ICD-10-CM

## 2023-08-02 DIAGNOSIS — S12.001D CLOSED NONDISPLACED FRACTURE OF FIRST CERVICAL VERTEBRA WITH ROUTINE HEALING, UNSPECIFIED FRACTURE MORPHOLOGY, SUBSEQUENT ENCOUNTER: Primary | ICD-10-CM

## 2023-08-02 PROCEDURE — 97140 MANUAL THERAPY 1/> REGIONS: CPT

## 2023-08-02 PROCEDURE — 97110 THERAPEUTIC EXERCISES: CPT

## 2023-08-02 NOTE — PROGRESS NOTES
Daily Note     Today's date: 2023  Patient name: Claudia Clinton  : 1995  MRN: [id number]  Referring provider: Lakisha Mendiola MD  Dx:   Encounter Diagnosis     ICD-10-CM    1. Closed nondisplaced fracture of first cervical vertebra with routine healing, unspecified fracture morphology, subsequent encounter  S12.001D       2. Cervical spine pain  M54.2           Start Time: 09  Stop Time: 1014  Total time in clinic (min): 39 minutes      Subjective: Patient reports her neck is feeling "fine."  Patient reports feeling "a little tight" when performing cervical rotation and cervical side bending. Patient reports her right elbow was tender and swollen following the last PT session. Objective: See treatment diary below. Assessment: Treatment is tolerated well. Right elbow remains tender during IASTM. Plan: Continue treatment as per PT plan of care.        Precautions: C1 fracture      Manuals  8/    Elbow/shoulder rom kl kl JLW          laser        cervical 4' cervical 4'    DTM        KL cervical cervical in supine  JLW    graston        R lateral epicondyle R lateral epicondyle  JLW    kinesiotape for scar         JLW                 Neuro Re-Ed                                                                                                        Ther Ex             Extension hang 1# x3' 1# 3'           Pulleys flexion/scaption  flexion 5"x20 flex, scap  5"x20 ea          rows  nv green  20 javier  18.2  3x10 javier  19.5  3x10 javier  20.1  3x10 javier 20.5 x20      lpd    javier  14.8  2x10 javier  15.1  3x10 javier  16.2  3x10 18.0 x20      Wrist curls  3#x20 flex, ext  3#  20 ea flex, ext  4#  30 ea flex, ext  5#  20 ea flex, ext  5#  20 ea 5"x20 ea      Pronation/  supination    5#  20 ea 5#  20 ea 5#  20 ea 5#x20ea      Elbow self-flexion stretch  hep           Wrist flex/ext stretch  30"x3ea 30"x3 ea     30"x2ea     Triceps press  nv green  20 javier  20.4  3x10 javier  21.8  3x10 javier  22.7  3x10   black tb  30    scap retraction with cane ext  10"x10 10"x10          push ups     counter   2x10  counter   3x10  counter   3x10 Plinth x20      Bicep curl     5#   3x10  8#   2x10  10#   2x10   8#   1x10  10#x20      shoulder flex, scap     3#   20 ea  3#   20 ea  3#   20 ea        wall pball taps      30"x2  30"x2       Cervical arom        Sb/rot 5"x10ea     Cervical isometrics        nv  5"x10 ea                 Ther Activity             ube  4'/4' 4'/4' 4'/4' 4'/4' 4'/4' 4'/4' nv                  Gait Training                                       Modalities

## 2023-08-09 ENCOUNTER — OFFICE VISIT (OUTPATIENT)
Dept: PHYSICAL THERAPY | Facility: CLINIC | Age: 28
End: 2023-08-09
Payer: COMMERCIAL

## 2023-08-09 DIAGNOSIS — M54.2 CERVICAL SPINE PAIN: Primary | ICD-10-CM

## 2023-08-09 DIAGNOSIS — S12.001D CLOSED NONDISPLACED FRACTURE OF FIRST CERVICAL VERTEBRA WITH ROUTINE HEALING, UNSPECIFIED FRACTURE MORPHOLOGY, SUBSEQUENT ENCOUNTER: ICD-10-CM

## 2023-08-09 PROCEDURE — 97140 MANUAL THERAPY 1/> REGIONS: CPT

## 2023-08-09 PROCEDURE — 97110 THERAPEUTIC EXERCISES: CPT

## 2023-08-09 NOTE — PROGRESS NOTES
Daily Note     Today's date: 2023  Patient name: Kimi Mccabe  : 1995  MRN: [id number]  Referring provider: Sushil Ornelas MD  Dx:   Encounter Diagnosis     ICD-10-CM    1. Cervical spine pain  M54.2       2. Closed nondisplaced fracture of first cervical vertebra with routine healing, unspecified fracture morphology, subsequent encounter  S12.001D           Start Time: 930  Stop Time: 101  Total time in clinic (min): 42 minutes      Subjective: Patient reports her neck is feeling good. Patient reports her right elbow is "better than it was last time."  Patient reports she's returned to exercising at the gym. Objective: See treatment diary below. Assessment: Treatment is tolerated well. Cervical ROM is WNLs. Pending continued progress, patient will be ready for discharge next session. Plan: Continue treatment as per PT plan of care.        Precautions: C1 fracture      Manuals  6 8 8   Elbow/shoulder rom kl kl JLW          laser        cervical 4' cervical 4' cervical 4'   DTM        KL cervical cervical in supine  JLW cervical in supine  JLW   graston        R lateral epicondyle R lateral epicondyle  JLW R lateral epicondyle  JLW   kinesiotape for scar         JLW                 Neuro Re-Ed                                                                                                        Ther Ex             Extension hang 1# x3' 1# 3'           Pulleys flexion/scaption  flexion 5"x20 flex, scap  5"x20 ea          rows  nv green  20 javier  18.2  3x10 javier  19.5  3x10 javier  20.1  3x10 javier 20.5 x20      lpd    javier  14.8  2x10 javier  15.1  3x10 javier  16.2  3x10 18.0 x20      Wrist curls  3#x20 flex, ext  3#  20 ea flex, ext  4#  30 ea flex, ext  5#  20 ea flex, ext  5#  20 ea 5"x20 ea      Pronation/  supination    5#  20 ea 5#  20 ea 5#  20 ea 5#x20ea      Elbow self-flexion stretch  hep           Wrist flex/ext stretch  30"x3ea 30"x3 ea     30"x2ea  manual   Triceps press  nv green  20 javier  20.4  3x10 javier  21.8  3x10 javier  22.7  3x10   black tb  30 javier  21.7  3x10   scap retraction with cane ext  10"x10 10"x10          push ups     counter   2x10  counter   3x10  counter   3x10 Plinth x20      Bicep curl     5#   3x10  8#   2x10  10#   2x10   8#   1x10 10#x20      shoulder flex, scap     3#   20 ea  3#   20 ea  3#   20 ea        wall pball taps      30"x2  30"x2       Cervical arom        Sb/rot 5"x10ea     Cervical isometrics        nv  5"x10 ea  5"x10 ea   ube           8' retro                Ther Activity             ube  4'/4' 4'/4' 4'/4' 4'/4' 4'/4' 4'/4' nv                  Gait Training                                       Modalities

## 2023-08-16 ENCOUNTER — APPOINTMENT (OUTPATIENT)
Dept: PHYSICAL THERAPY | Facility: CLINIC | Age: 28
End: 2023-08-16
Payer: COMMERCIAL

## 2023-08-17 ENCOUNTER — OFFICE VISIT (OUTPATIENT)
Dept: PHYSICAL THERAPY | Facility: CLINIC | Age: 28
End: 2023-08-17
Payer: COMMERCIAL

## 2023-08-17 DIAGNOSIS — M54.2 CERVICAL SPINE PAIN: Primary | ICD-10-CM

## 2023-08-17 DIAGNOSIS — M89.8X2 PAIN OF RIGHT HUMERUS: ICD-10-CM

## 2023-08-17 DIAGNOSIS — S12.001D CLOSED NONDISPLACED FRACTURE OF FIRST CERVICAL VERTEBRA WITH ROUTINE HEALING, UNSPECIFIED FRACTURE MORPHOLOGY, SUBSEQUENT ENCOUNTER: ICD-10-CM

## 2023-08-17 PROCEDURE — 97140 MANUAL THERAPY 1/> REGIONS: CPT

## 2023-08-17 PROCEDURE — 97530 THERAPEUTIC ACTIVITIES: CPT

## 2023-08-17 PROCEDURE — 97110 THERAPEUTIC EXERCISES: CPT

## 2023-08-17 NOTE — PROGRESS NOTES
Daily Note     Today's date: 2023  Patient name: May Dupree  : 1995  MRN: [id number]  Referring provider: Babar Bajwa MD  Dx:   Encounter Diagnosis     ICD-10-CM    1. Cervical spine pain  M54.2       2. Closed nondisplaced fracture of first cervical vertebra with routine healing, unspecified fracture morphology, subsequent encounter  S12.001D       3. Pain of right humerus  M89.8X2           Start Time: 0850  Stop Time: 935  Total time in clinic (min): 45 minutes    Subjective: Patient reports that she has no pain or complaints today. Objective: See treatment diary below      Assessment: Patient continues to tolerate treatment well. Good form and understanding of exercises performed. No discomfort reported with manual therapy. Patient reports today is her last scheduled visit. FOTO completed. Plan: Continue per plan of care.       Precautions: C1 fracture      Manuals    Elbow/shoulder rom kl kl JLW           laser        cervical 4' cervical 4' cervical 4' Cervical 4'   DTM        KL cervical cervical in supine  JLW cervical in supine  JLW cervical in supine JG   graston        R lateral epicondyle R lateral epicondyle  JLW R lateral epicondyle  JLW R lateral epicondyle JG   kinesiotape for scar         JLW                   Neuro Re-Ed                                                                                                                Ther Ex              Extension hang 1# x3' 1# 3'            Pulleys flexion/scaption  flexion 5"x20 flex, scap  5"x20 ea           rows  nv green  20 javier  18.2  3x10 javier  19.5  3x10 javier  20.1  3x10 javier 20.5 x20       lpd    javier  14.8  2x10 javier  15.1  3x10 javier  16.2  3x10 18.0 x20       Wrist curls  3#x20 flex, ext  3#  20 ea flex, ext  4#  30 ea flex, ext  5#  20 ea flex, ext  5#  20 ea 5"x20 ea       Pronation/  supination    5#  20 ea 5#  20 ea 5#  20 ea 5#x20ea       Elbow self-flexion stretch  hep            Wrist flex/ext stretch  30"x3ea 30"x3 ea     30"x2ea  manual 30"x2 ea   Triceps press  nv green  20 javier  20.4  3x10 javier  21.8  3x10 javier  22.7  3x10   black tb  30 javier  21.7  3x10 javier 21.7# 3x10   scap retraction with cane ext  10"x10 10"x10           push ups     counter   2x10  counter   3x10  counter   3x10 Plinth x20       Bicep curl     5#   3x10  8#   2x10  10#   2x10   8#   1x10 10#x20       shoulder flex, scap     3#   20 ea  3#   20 ea  3#   20 ea         wall pball taps      30"x2  30"x2        Cervical arom        Sb/rot 5"x10ea      Cervical isometrics        nv  5"x10 ea  5"x10 ea 5"x10ea   ube           8' retro 8' retro                 Ther Activity              ube  4'/4' 4'/4' 4'/4' 4'/4' 4'/4' 4'/4' nv                    Gait Training                                          Modalities

## 2023-09-19 ENCOUNTER — HOSPITAL ENCOUNTER (OUTPATIENT)
Dept: RADIOLOGY | Facility: HOSPITAL | Age: 28
Discharge: HOME | End: 2023-09-19
Attending: ORTHOPAEDIC SURGERY
Payer: COMMERCIAL

## 2023-09-19 ENCOUNTER — OFFICE VISIT (OUTPATIENT)
Dept: OBGYN CLINIC | Facility: HOSPITAL | Age: 28
End: 2023-09-19
Payer: COMMERCIAL

## 2023-09-19 VITALS
HEART RATE: 69 BPM | WEIGHT: 224 LBS | HEIGHT: 64 IN | BODY MASS INDEX: 38.24 KG/M2 | SYSTOLIC BLOOD PRESSURE: 113 MMHG | DIASTOLIC BLOOD PRESSURE: 76 MMHG

## 2023-09-19 DIAGNOSIS — S12.001D CLOSED NONDISPLACED FRACTURE OF FIRST CERVICAL VERTEBRA WITH ROUTINE HEALING, UNSPECIFIED FRACTURE MORPHOLOGY, SUBSEQUENT ENCOUNTER: ICD-10-CM

## 2023-09-19 DIAGNOSIS — S42.324D CLOSED NONDISPLACED TRANSVERSE FRACTURE OF SHAFT OF RIGHT HUMERUS WITH ROUTINE HEALING, SUBSEQUENT ENCOUNTER: Primary | ICD-10-CM

## 2023-09-19 PROCEDURE — 99213 OFFICE O/P EST LOW 20 MIN: CPT | Performed by: ORTHOPAEDIC SURGERY

## 2023-09-19 PROCEDURE — 72050 X-RAY EXAM NECK SPINE 4/5VWS: CPT
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
## 2023-09-26 ENCOUNTER — OFFICE VISIT (OUTPATIENT)
Dept: OBGYN CLINIC | Facility: HOSPITAL | Age: 28
End: 2023-09-26
Payer: COMMERCIAL

## 2023-09-26 ENCOUNTER — HOSPITAL ENCOUNTER (OUTPATIENT)
Dept: RADIOLOGY | Facility: HOSPITAL | Age: 28
Discharge: HOME | End: 2023-09-26
Attending: ORTHOPAEDIC SURGERY
Payer: COMMERCIAL

## 2023-09-26 VITALS — HEART RATE: 76 BPM | SYSTOLIC BLOOD PRESSURE: 110 MMHG | DIASTOLIC BLOOD PRESSURE: 72 MMHG

## 2023-09-26 DIAGNOSIS — S42.324D CLOSED NONDISPLACED TRANSVERSE FRACTURE OF SHAFT OF RIGHT HUMERUS WITH ROUTINE HEALING, SUBSEQUENT ENCOUNTER: ICD-10-CM

## 2023-09-26 DIAGNOSIS — S42.324D CLOSED NONDISPLACED TRANSVERSE FRACTURE OF SHAFT OF RIGHT HUMERUS WITH ROUTINE HEALING, SUBSEQUENT ENCOUNTER: Primary | ICD-10-CM

## 2023-09-26 PROCEDURE — 99213 OFFICE O/P EST LOW 20 MIN: CPT | Performed by: ORTHOPAEDIC SURGERY

## 2023-09-26 PROCEDURE — 73060 X-RAY EXAM OF HUMERUS: CPT

## 2023-09-26 NOTE — PROGRESS NOTES
Assessment:  1. Closed nondisplaced transverse fracture of shaft of right humerus with routine healing, subsequent encounter  XR humerus right          Plan:  4.5 months s/p right humerus ORIF at [facility] on 5/8/2023  · N restrictions with activity  · Consider evaluation with hand surgeon if right hand numbness persists  · Patient has right hand numbness with mid-shaft radial tinnel tapping   · Follow up in 3 months    To do next visit:  Return in about 3 months (around 12/26/2023) for re-check with x-rays. The above stated was discussed in layman's terms and the patient expressed understanding. All questions were answered to the patient's satisfaction. Scribe Attestation    I,:  Ismael Quintero am acting as a scribe while in the presence of the attending physician.:       I,:  Gaviota Tena MD personally performed the services described in this documentation    as scribed in my presence.:             Subjective:   Naomie Lamb is a 33 y.o. female who presents 4.5 months s/p right humerus ORIF at [facility] on 5/8/2023. She is doing well. Today she complains of occasional right hand numbness when touching mid-radial forearm. She is active without repercussion.         Review of systems negative unless otherwise specified in HPI    Past Medical History:   Diagnosis Date   • Adrenal mass ([address])    • Heartburn        Past Surgical History:   Procedure Laterality Date   • [place] LAPAROSCOPY ADRENALECTOMY PRTL/COMPL TABDL Right 7/21/2016    Procedure: ADRENALECTOMY LAPAROSCOPIC W/ ROBOTICS; POSSIBLE OPEN;  Surgeon: Elsa Ash MD;  Location: [facility];  Service: General   • TONSILLECTOMY     • WISDOM TOOTH EXTRACTION         Family History   Problem Relation Age of Onset   • Diabetes Mother    • No Known Problems Father    • No Known Problems Sister    • Lung cancer Paternal Grandmother    • Heart attack Paternal Grandmother    • Hypertension Paternal Grandmother Social History     Occupational History   • Not on file   Tobacco Use   • Smoking status: Never   • Smokeless tobacco: Never   Vaping Use   • Vaping Use: Never used   Substance and Sexual Activity   • Alcohol use:  Yes   • Drug use: No   • Sexual activity: Yes     Partners: Male     Birth control/protection: None         Current Outpatient Medications:   •  acetaminophen (Tylenol) 325 mg tablet, 650 mg (Patient not taking: Reported on 9/26/2023), Disp: , Rfl:   •  docusate sodium (Colace) 100 mg capsule, 100 mg (Patient not taking: Reported on 9/26/2023), Disp: , Rfl:   •  hydrocortisone-pramoxine (ANALPRAM-HC) 2.5-1 % rectal cream, Apply topically 3 (three) times a day (Patient not taking: Reported on 9/26/2023), Disp: 30 g, Rfl: 3  •  imiquimod (ALDARA) 5 % cream, Apply 1 packet topically 3 (three) times a week, Disp: 24 packet, Rfl: 3  •  LORazepam (ATIVAN) 0.5 mg tablet, , Disp: , Rfl:   •  omeprazole (PriLOSEC) 10 mg delayed release capsule, 10 mg (Patient not taking: Reported on 9/26/2023), Disp: , Rfl:   •  omeprazole (PriLOSEC) 40 MG capsule, 1 capsule (Patient not taking: Reported on 9/26/2023), Disp: , Rfl:   •  ondansetron (ZOFRAN) 4 mg tablet, TAKE 1 TABLET BY MOUTH EVERY 8 HOURS AS NEEDED FOR NAUSEA/VOMITING (Patient not taking: Reported on 9/26/2023), Disp: , Rfl:   •  oxyCODONE (ROXICODONE) 5 immediate release tablet, TAKE 1 TABLET BY MOUTH EVERY 6 HOURS AS NEEDED FOR SEVERE PAIN (Patient not taking: Reported on 5/16/2023), Disp: , Rfl:   •  senna (Senokot) 8.6 MG tablet, 8.6 mg (Patient not taking: Reported on 9/26/2023), Disp: , Rfl:   •  sulfamethoxazole-trimethoprim (BACTRIM DS) 800-160 mg per tablet, Take 1 tablet by mouth 2 (two) times a day (Patient not taking: Reported on 5/23/2023), Disp: , Rfl:   •  valACYclovir (VALTREX) 1,000 mg tablet, Take 1 tablet (1,000 mg total) by mouth 2 (two) times a day for 7 days, Disp: 14 tablet, Rfl: 0    No Known Allergies         Vitals:    09/26/23 0747 BP: 110/72   Pulse: 76       Objective:  Physical exam  · General: Awake, Alert, Oriented  · Eyes: Pupils equal, round and reactive to light  · Heart: regular rate and rhythm  · Lungs: No audible wheezing  · Abdomen: soft                    Ortho Exam  Right upper extremity:  No erythema or ecchymosis  Well healed anterior incisional scar  Full ROM in all planes with shoulder  Full ROM at elbow  Normal motion at wrist  Sensation intact distally  Right hand numbness with tinnel tapping over mid-radial shaft    Diagnostics, reviewed and taken today if performed as documented: The attending physician has personally reviewed the pertinent films in PACS and interpretation is as follows:  Right humerus x-ray:  Well healed mid-shaft humerus fracture with abundant callus formation and intact surgical hardware. Procedures, if performed today:    Procedures    None performed      Portions of the record may have been created with voice recognition software. Occasional wrong word or "sound a like" substitutions may have occurred due to the inherent limitations of voice recognition software. Read the chart carefully and recognize, using context, where substitutions have occurred.

## 2023-12-13 DIAGNOSIS — S42.324D CLOSED NONDISPLACED TRANSVERSE FRACTURE OF SHAFT OF RIGHT HUMERUS WITH ROUTINE HEALING, SUBSEQUENT ENCOUNTER: Primary | ICD-10-CM

## 2023-12-19 ENCOUNTER — HOSPITAL ENCOUNTER (OUTPATIENT)
Dept: RADIOLOGY | Facility: HOSPITAL | Age: 28
Discharge: HOME | End: 2023-12-19
Attending: ORTHOPAEDIC SURGERY
Payer: COMMERCIAL

## 2023-12-19 ENCOUNTER — OFFICE VISIT (OUTPATIENT)
Dept: OBGYN CLINIC | Facility: HOSPITAL | Age: 28
End: 2023-12-19
Payer: COMMERCIAL

## 2023-12-19 VITALS
HEIGHT: 64 IN | DIASTOLIC BLOOD PRESSURE: 74 MMHG | HEART RATE: 69 BPM | SYSTOLIC BLOOD PRESSURE: 112 MMHG | BODY MASS INDEX: 38.45 KG/M2

## 2023-12-19 DIAGNOSIS — S42.324D CLOSED NONDISPLACED TRANSVERSE FRACTURE OF SHAFT OF RIGHT HUMERUS WITH ROUTINE HEALING, SUBSEQUENT ENCOUNTER: Primary | ICD-10-CM

## 2023-12-19 DIAGNOSIS — S42.324D CLOSED NONDISPLACED TRANSVERSE FRACTURE OF SHAFT OF RIGHT HUMERUS WITH ROUTINE HEALING, SUBSEQUENT ENCOUNTER: ICD-10-CM

## 2023-12-19 PROCEDURE — 73060 X-RAY EXAM OF HUMERUS: CPT

## 2023-12-19 PROCEDURE — 99214 OFFICE O/P EST MOD 30 MIN: CPT | Performed by: ORTHOPAEDIC SURGERY

## 2024-03-13 ENCOUNTER — HOSPITAL ENCOUNTER (OUTPATIENT)
Dept: RADIOLOGY | Facility: HOSPITAL | Age: 29
Discharge: HOME | End: 2024-03-13
Attending: ORTHOPAEDIC SURGERY
Payer: COMMERCIAL

## 2024-03-13 ENCOUNTER — OFFICE VISIT (OUTPATIENT)
Dept: OBGYN CLINIC | Facility: HOSPITAL | Age: 29
End: 2024-03-13
Payer: COMMERCIAL

## 2024-03-13 VITALS
SYSTOLIC BLOOD PRESSURE: 115 MMHG | HEART RATE: 73 BPM | WEIGHT: 224.87 LBS | BODY MASS INDEX: 38.39 KG/M2 | HEIGHT: 64 IN | DIASTOLIC BLOOD PRESSURE: 77 MMHG

## 2024-03-13 DIAGNOSIS — R52 PAIN: Primary | ICD-10-CM

## 2024-03-13 DIAGNOSIS — R52 PAIN: ICD-10-CM

## 2024-03-13 PROCEDURE — 72040 X-RAY EXAM NECK SPINE 2-3 VW: CPT

## 2024-03-13 PROCEDURE — 99213 OFFICE O/P EST LOW 20 MIN: CPT | Performed by: ORTHOPAEDIC SURGERY
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
## 2025-08-05 DIAGNOSIS — M79.621 PAIN IN RIGHT UPPER ARM: ICD-10-CM

## 2025-08-06 ENCOUNTER — APPOINTMENT (OUTPATIENT)
Age: 30
End: 2025-08-06
Payer: COMMERCIAL